# Patient Record
Sex: MALE | Race: WHITE | URBAN - NONMETROPOLITAN AREA
[De-identification: names, ages, dates, MRNs, and addresses within clinical notes are randomized per-mention and may not be internally consistent; named-entity substitution may affect disease eponyms.]

---

## 2021-07-23 ENCOUNTER — OFFICE VISIT (OUTPATIENT)
Dept: URBAN - NONMETROPOLITAN AREA CLINIC 10 | Facility: CLINIC | Age: 31
End: 2021-07-23
Payer: COMMERCIAL

## 2021-07-23 DIAGNOSIS — H52.12 MYOPIA, LEFT EYE: Primary | ICD-10-CM

## 2021-07-23 PROCEDURE — 92004 COMPRE OPH EXAM NEW PT 1/>: CPT | Performed by: OPTOMETRIST

## 2021-07-23 ASSESSMENT — VISUAL ACUITY
OS: 20/20
OD: 20/20

## 2021-07-23 ASSESSMENT — KERATOMETRY
OS: 42.50
OD: 42.88

## 2021-07-23 NOTE — IMPRESSION/PLAN
Impression: Myopia, left eye: H52.12. Plan: Gave Rx for specs and instructed on adaptation period, wear time.

## 2022-12-29 ENCOUNTER — OFFICE VISIT (OUTPATIENT)
Dept: URBAN - NONMETROPOLITAN AREA CLINIC 10 | Facility: CLINIC | Age: 32
End: 2022-12-29
Payer: COMMERCIAL

## 2022-12-29 DIAGNOSIS — H52.12 MYOPIA, LEFT EYE: ICD-10-CM

## 2022-12-29 DIAGNOSIS — H16.223 KERATOCONJUNCT SICCA, NOT SPECIFIED AS SJOGREN'S, BILATERAL: Primary | ICD-10-CM

## 2022-12-29 PROCEDURE — 92012 INTRM OPH EXAM EST PATIENT: CPT | Performed by: STUDENT IN AN ORGANIZED HEALTH CARE EDUCATION/TRAINING PROGRAM

## 2022-12-29 ASSESSMENT — VISUAL ACUITY
OD: 20/20
OS: 20/20

## 2022-12-29 NOTE — IMPRESSION/PLAN
Impression: Keratoconjunct sicca, not specified as Sjogren's, bilateral: I53.054. Plan: Patient educated on diagnosis. Recommend artificial tears ( Systane, Refresh, FreshKote, TheraTears, Soothe) 3-4x/day OU and warm compresses 2x/day for 10 minutes at a time (with clean washcloth or clean sock filled with uncooked rice in microwave for 45-60 seconds). - Discussed punctal plugs, Restasis, Xiidra as second line treatment options

## 2022-12-29 NOTE — IMPRESSION/PLAN
Impression: Myopia, left eye: H52.12. Plan: Patient educated on refractive error. New glasses prescription dispensed to patient, expires 1 year. Adaptation period explained.

## 2024-08-19 ENCOUNTER — OFFICE VISIT (OUTPATIENT)
Dept: URBAN - NONMETROPOLITAN AREA CLINIC 10 | Facility: CLINIC | Age: 34
End: 2024-08-19
Payer: COMMERCIAL

## 2024-08-19 DIAGNOSIS — H52.12 MYOPIA, LEFT EYE: Primary | ICD-10-CM

## 2024-08-19 PROCEDURE — 92012 INTRM OPH EXAM EST PATIENT: CPT | Performed by: OPTOMETRIST

## 2024-08-19 ASSESSMENT — VISUAL ACUITY
OD: 20/20
OS: 20/20

## 2025-08-18 ENCOUNTER — OFFICE VISIT (OUTPATIENT)
Dept: URBAN - NONMETROPOLITAN AREA CLINIC 10 | Facility: CLINIC | Age: 35
End: 2025-08-18
Payer: COMMERCIAL

## 2025-08-18 DIAGNOSIS — H52.12 MYOPIA, LEFT EYE: Primary | ICD-10-CM

## 2025-08-18 PROCEDURE — 92014 COMPRE OPH EXAM EST PT 1/>: CPT | Performed by: OPTOMETRIST

## 2025-08-18 ASSESSMENT — VISUAL ACUITY
OD: 20/20
OS: 20/20